# Patient Record
Sex: FEMALE | Race: OTHER | HISPANIC OR LATINO | URBAN - METROPOLITAN AREA
[De-identification: names, ages, dates, MRNs, and addresses within clinical notes are randomized per-mention and may not be internally consistent; named-entity substitution may affect disease eponyms.]

---

## 2019-09-20 ENCOUNTER — INPATIENT (INPATIENT)
Facility: HOSPITAL | Age: 59
LOS: 0 days | Discharge: HOME | DRG: 71 | End: 2019-09-21
Attending: HOSPITALIST | Admitting: HOSPITALIST
Payer: COMMERCIAL

## 2019-09-20 VITALS
HEART RATE: 66 BPM | DIASTOLIC BLOOD PRESSURE: 83 MMHG | TEMPERATURE: 98 F | WEIGHT: 220.02 LBS | OXYGEN SATURATION: 99 % | RESPIRATION RATE: 20 BRPM | HEIGHT: 65 IN | SYSTOLIC BLOOD PRESSURE: 147 MMHG

## 2019-09-20 DIAGNOSIS — R41.82 ALTERED MENTAL STATUS, UNSPECIFIED: ICD-10-CM

## 2019-09-20 DIAGNOSIS — G93.40 ENCEPHALOPATHY, UNSPECIFIED: ICD-10-CM

## 2019-09-20 LAB
ALBUMIN SERPL ELPH-MCNC: 4.5 G/DL — SIGNIFICANT CHANGE UP (ref 3.3–5.2)
ALP SERPL-CCNC: 74 U/L — SIGNIFICANT CHANGE UP (ref 40–120)
ALT FLD-CCNC: 12 U/L — SIGNIFICANT CHANGE UP
ANION GAP SERPL CALC-SCNC: 13 MMOL/L — SIGNIFICANT CHANGE UP (ref 5–17)
APPEARANCE UR: CLEAR — SIGNIFICANT CHANGE UP
APTT BLD: 35.9 SEC — SIGNIFICANT CHANGE UP (ref 27.5–36.3)
AST SERPL-CCNC: 21 U/L — SIGNIFICANT CHANGE UP
BACTERIA # UR AUTO: NEGATIVE — SIGNIFICANT CHANGE UP
BASOPHILS # BLD AUTO: 0.03 K/UL — SIGNIFICANT CHANGE UP (ref 0–0.2)
BASOPHILS NFR BLD AUTO: 0.6 % — SIGNIFICANT CHANGE UP (ref 0–2)
BILIRUB SERPL-MCNC: 0.4 MG/DL — SIGNIFICANT CHANGE UP (ref 0.4–2)
BILIRUB UR-MCNC: NEGATIVE — SIGNIFICANT CHANGE UP
BLD GP AB SCN SERPL QL: SIGNIFICANT CHANGE UP
BUN SERPL-MCNC: 17 MG/DL — SIGNIFICANT CHANGE UP (ref 8–20)
CALCIUM SERPL-MCNC: 9.7 MG/DL — SIGNIFICANT CHANGE UP (ref 8.6–10.2)
CHLORIDE SERPL-SCNC: 105 MMOL/L — SIGNIFICANT CHANGE UP (ref 98–107)
CO2 SERPL-SCNC: 25 MMOL/L — SIGNIFICANT CHANGE UP (ref 22–29)
COLOR SPEC: YELLOW — SIGNIFICANT CHANGE UP
CREAT SERPL-MCNC: 0.58 MG/DL — SIGNIFICANT CHANGE UP (ref 0.5–1.3)
DIFF PNL FLD: NEGATIVE — SIGNIFICANT CHANGE UP
EOSINOPHIL # BLD AUTO: 0.02 K/UL — SIGNIFICANT CHANGE UP (ref 0–0.5)
EOSINOPHIL NFR BLD AUTO: 0.4 % — SIGNIFICANT CHANGE UP (ref 0–6)
EPI CELLS # UR: SIGNIFICANT CHANGE UP
ETHANOL SERPL-MCNC: <10 MG/DL — SIGNIFICANT CHANGE UP
GLUCOSE SERPL-MCNC: 88 MG/DL — SIGNIFICANT CHANGE UP (ref 70–115)
GLUCOSE UR QL: NEGATIVE MG/DL — SIGNIFICANT CHANGE UP
HBA1C BLD-MCNC: 5.1 % — SIGNIFICANT CHANGE UP (ref 4–5.6)
HCT VFR BLD CALC: 39.2 % — SIGNIFICANT CHANGE UP (ref 34.5–45)
HGB BLD-MCNC: 13.1 G/DL — SIGNIFICANT CHANGE UP (ref 11.5–15.5)
IMM GRANULOCYTES NFR BLD AUTO: 0.4 % — SIGNIFICANT CHANGE UP (ref 0–1.5)
INR BLD: 1.01 RATIO — SIGNIFICANT CHANGE UP (ref 0.88–1.16)
KETONES UR-MCNC: NEGATIVE — SIGNIFICANT CHANGE UP
LEUKOCYTE ESTERASE UR-ACNC: ABNORMAL
LYMPHOCYTES # BLD AUTO: 1.41 K/UL — SIGNIFICANT CHANGE UP (ref 1–3.3)
LYMPHOCYTES # BLD AUTO: 26.5 % — SIGNIFICANT CHANGE UP (ref 13–44)
MCHC RBC-ENTMCNC: 29.3 PG — SIGNIFICANT CHANGE UP (ref 27–34)
MCHC RBC-ENTMCNC: 33.4 GM/DL — SIGNIFICANT CHANGE UP (ref 32–36)
MCV RBC AUTO: 87.7 FL — SIGNIFICANT CHANGE UP (ref 80–100)
MONOCYTES # BLD AUTO: 0.38 K/UL — SIGNIFICANT CHANGE UP (ref 0–0.9)
MONOCYTES NFR BLD AUTO: 7.1 % — SIGNIFICANT CHANGE UP (ref 2–14)
NEUTROPHILS # BLD AUTO: 3.47 K/UL — SIGNIFICANT CHANGE UP (ref 1.8–7.4)
NEUTROPHILS NFR BLD AUTO: 65 % — SIGNIFICANT CHANGE UP (ref 43–77)
NITRITE UR-MCNC: NEGATIVE — SIGNIFICANT CHANGE UP
PH UR: 6.5 — SIGNIFICANT CHANGE UP (ref 5–8)
PLATELET # BLD AUTO: 280 K/UL — SIGNIFICANT CHANGE UP (ref 150–400)
POTASSIUM SERPL-MCNC: 4.2 MMOL/L — SIGNIFICANT CHANGE UP (ref 3.5–5.3)
POTASSIUM SERPL-SCNC: 4.2 MMOL/L — SIGNIFICANT CHANGE UP (ref 3.5–5.3)
PROT SERPL-MCNC: 7.9 G/DL — SIGNIFICANT CHANGE UP (ref 6.6–8.7)
PROT UR-MCNC: NEGATIVE MG/DL — SIGNIFICANT CHANGE UP
PROTHROM AB SERPL-ACNC: 11.6 SEC — SIGNIFICANT CHANGE UP (ref 10–12.9)
RBC # BLD: 4.47 M/UL — SIGNIFICANT CHANGE UP (ref 3.8–5.2)
RBC # FLD: 12.2 % — SIGNIFICANT CHANGE UP (ref 10.3–14.5)
RBC CASTS # UR COMP ASSIST: NEGATIVE /HPF — SIGNIFICANT CHANGE UP (ref 0–4)
SODIUM SERPL-SCNC: 143 MMOL/L — SIGNIFICANT CHANGE UP (ref 135–145)
SP GR SPEC: 1.01 — SIGNIFICANT CHANGE UP (ref 1.01–1.02)
TROPONIN T SERPL-MCNC: <0.01 NG/ML — SIGNIFICANT CHANGE UP (ref 0–0.06)
UROBILINOGEN FLD QL: NEGATIVE MG/DL — SIGNIFICANT CHANGE UP
WBC # BLD: 5.33 K/UL — SIGNIFICANT CHANGE UP (ref 3.8–10.5)
WBC # FLD AUTO: 5.33 K/UL — SIGNIFICANT CHANGE UP (ref 3.8–10.5)
WBC UR QL: SIGNIFICANT CHANGE UP

## 2019-09-20 PROCEDURE — 70496 CT ANGIOGRAPHY HEAD: CPT | Mod: 26

## 2019-09-20 PROCEDURE — 95819 EEG AWAKE AND ASLEEP: CPT | Mod: 26

## 2019-09-20 PROCEDURE — 99223 1ST HOSP IP/OBS HIGH 75: CPT

## 2019-09-20 PROCEDURE — 71045 X-RAY EXAM CHEST 1 VIEW: CPT | Mod: 26

## 2019-09-20 PROCEDURE — 70498 CT ANGIOGRAPHY NECK: CPT | Mod: 26

## 2019-09-20 PROCEDURE — 70450 CT HEAD/BRAIN W/O DYE: CPT | Mod: 26,59

## 2019-09-20 PROCEDURE — 70551 MRI BRAIN STEM W/O DYE: CPT | Mod: 26

## 2019-09-20 PROCEDURE — 99285 EMERGENCY DEPT VISIT HI MDM: CPT

## 2019-09-20 RX ORDER — ATORVASTATIN CALCIUM 80 MG/1
20 TABLET, FILM COATED ORAL AT BEDTIME
Refills: 0 | Status: DISCONTINUED | OUTPATIENT
Start: 2019-09-20 | End: 2019-09-21

## 2019-09-20 RX ORDER — ASPIRIN/CALCIUM CARB/MAGNESIUM 324 MG
81 TABLET ORAL DAILY
Refills: 0 | Status: DISCONTINUED | OUTPATIENT
Start: 2019-09-20 | End: 2019-09-21

## 2019-09-20 RX ORDER — ACETAMINOPHEN 500 MG
650 TABLET ORAL ONCE
Refills: 0 | Status: COMPLETED | OUTPATIENT
Start: 2019-09-20 | End: 2019-09-20

## 2019-09-20 RX ORDER — METOCLOPRAMIDE HCL 10 MG
10 TABLET ORAL ONCE
Refills: 0 | Status: COMPLETED | OUTPATIENT
Start: 2019-09-20 | End: 2019-09-20

## 2019-09-20 RX ORDER — ACETAMINOPHEN 500 MG
650 TABLET ORAL EVERY 6 HOURS
Refills: 0 | Status: DISCONTINUED | OUTPATIENT
Start: 2019-09-20 | End: 2019-09-21

## 2019-09-20 RX ADMIN — ATORVASTATIN CALCIUM 20 MILLIGRAM(S): 80 TABLET, FILM COATED ORAL at 22:56

## 2019-09-20 RX ADMIN — Medication 81 MILLIGRAM(S): at 12:11

## 2019-09-20 RX ADMIN — Medication 650 MILLIGRAM(S): at 12:30

## 2019-09-20 RX ADMIN — Medication 650 MILLIGRAM(S): at 15:16

## 2019-09-20 RX ADMIN — Medication 10 MILLIGRAM(S): at 14:30

## 2019-09-20 NOTE — H&P ADULT - ASSESSMENT
60 yo femlae with no significant PMH admitted for altered  mental status and right sided facial numbness , seen by neurology CTA of brain neck done no pathology , elevated BP in the ED , no h/o HTN per      1- Acute encephalopahy with paresthesias on the right face   neurology consult appreciated   MR to asses for stroke   continue aspirin statin   monitor BP , treat if needed     2- Elevated bloop pressure   no h/o HTN   may have labile HTN , will cont to monitor treat if needed   BPq4h     pending neurology work up and BP home soon

## 2019-09-20 NOTE — ED ADULT TRIAGE NOTE - CHIEF COMPLAINT QUOTE
got up 4:00am shira to [employer] came back  home at 5;30-6, when she got home she asked  why did you leave me at [employer] and then asking about food that was in her car that she bought and he didn't know anyhting about

## 2019-09-20 NOTE — CONSULT NOTE ADULT - ASSESSMENT
Impression:  Encephalopathy to assess for cva.    Plan:    MRI Brain without maxime to assess for cva.  ER getting CTA head/neck.  UDS screen.  EEG to assess for seizures.  Recommend baby asa qd.  DVT prophylaxis.  Avoid sedatives.  D/w ER MD and pt.  Will follow.

## 2019-09-20 NOTE — EEG REPORT - NS EEG TEXT BOX
[facility]   COMPREHENSIVE EPILEPSY CENTER   REPORT OF ROUTINE VIDEO EEG     [facility]: [address], [place] [address], Ph#: [phone]  [facility]: [address], [place] [address], Ph#: [phone]  [facility]: [address] [place] [address]    Patient Name: KELSI LEIJA  Age and : 59y (60)  MRN #: [id number]  Location: [place] [address]  Referring Physician: Faustino Gomez    Study Date: 19    _____________________________________________________________  TECHNICAL INFORMATION    Placement and Labeling of Electrodes:  The EEG was performed utilizing 20 channels referential EEG connections (coronal over temporal over parasagittal montage) using all standard 10-20 electrode placements with EKG.  Recording was at a sampling rate of 256 samples per second per channel.  Time synchronized digital video recording was done simultaneously with EEG recording.  A low light infrared camera was used for low light recording.  Blayne and seizure detection algorithms were utilized.    _____________________________________________________________  HISTORY    Patient is a 59y old  Female who presents with a chief complaint of     PERTINENT MEDICATION:  none    _____________________________________________________________  STUDY INTERPRETATION    Findings: The background was continuous, spontaneously variable and reactive. During wakefulness, the posterior dominant rhythm consisted of symmetric, well-modulated 10-11 Hz activity, with amplitude to 30 uV, that attenuated to eye opening.  Low amplitude frontal beta was noted in wakefulness.    Background Slowing:  No generalized background slowing was present.    Focal Slowing:   None were present.    Sleep Background:  Drowsiness was characterized by fragmentation, attenuation, and slowing of the background activity.    Sleep was characterized by the presence of vertex waves, symmetric sleep spindles and K-complexes.    Other Non-Epileptiform Findings:  None were present.    Interictal Epileptiform Activity:   None were present.    Events:  Clinical events: None recorded.  Seizures: None recorded.    Activation Procedures:   Hyperventilation was not performed.    Photic stimulation was performed and did not elicit any abnormality.     Artifacts:  Intermittent myogenic and movement artifacts were noted.    ECG:  The heart rate on single channel ECG was predominantly between 60-70 BPM.    _____________________________________________________________  EEG SUMMARY/CLASSIFICATION    Normal EEG in the awake, drowsy and asleep states.    _____________________________________________________________  EEG IMPRESSION/CLINICAL CORRELATE    Normal EEG study.  No epileptiform pattern or seizure seen.    _____________________________________________________________    Gavin Kelsey MD  Attending Physician, [facility] [place]

## 2019-09-20 NOTE — ED PROVIDER NOTE - PHYSICAL EXAMINATION
Gen: Alert, NAD  Head: NC, AT, PERRL, EOMI, normal lids/conjunctiva  Neck: +supple, no tenderness/meningismus/JVD, +Trachea midline  Pulm: Bilateral BS, normal resp effort, no wheeze/stridor/retractions  CV: RRR, no M/R/G, +dist pulses  Abd: soft, NT/ND, +BS, no hepatosplenomegaly  Mskel: no edema/erythema/cyanosis  Skin: no rash  Neuro: AAOx1,  see NIHSS below

## 2019-09-20 NOTE — ED ADULT NURSE NOTE - OBJECTIVE STATEMENT
Assumed pt care at 0900.  Pt presenting with acute AMS.  LKW last night, pt a&ox1, MAEx4, no acute s/s of respiratory distress noted or reported at this time,  will continue to monitor.

## 2019-09-20 NOTE — ED PROVIDER NOTE - NS ED ROS FT

## 2019-09-20 NOTE — H&P ADULT - HISTORY OF PRESENT ILLNESS
59y Female RH Sammarinese speaking with no significant  pmh presents with ams upon awakening this AM. Patient last known well last night at 9pm.  As per ER and her  states he went to work and wife went to [employer] around 4am, but he did not see her prior to leaving house.  Spouse called at approximately 5 am and states patient was confused and was asking why he didn't pick her up from work.  Patient denies headache. denies n/v , denies blurry vision/double vision. denies numbness/tingling. denies focal weakness. denies cp/sob/palp.  No reports of fevers or similar in past.  No head injuries nor any fall.  Complains of disturbance on the right side of face lip numbness and mild headache during my evalutaion , mental status much improved now , pt does not recall what happened in am

## 2019-09-20 NOTE — ED ADULT NURSE NOTE - CHIEF COMPLAINT QUOTE
got up 4:00am [employer] to [employer] came back  home at 5;30-6, when she got home she asked  why did you leave me at [employer] and then asking about food that was in her car that she bought and he didn't know anyhting about

## 2019-09-20 NOTE — ED ADULT NURSE REASSESSMENT NOTE - NS ED NURSE REASSESS COMMENT FT1
Pt mental status has improved, a&ox2-3, aware of name, birthday, date and month, unsure of year.  Report given to BB RN ESSU, no acute s/s of respiratory distress noted or reported at this time, will continue to monitor

## 2019-09-20 NOTE — CONSULT NOTE ADULT - SUBJECTIVE AND OBJECTIVE BOX
HPI: 59yFemale RH Korean speaking with no signif pmh presents with ams upon awakening this AM. Patient last known well last night at 9pm.  As per ER and family,  states he went to work and wife went to [employer] around 4am, but he did not see her prior to leaving house.  Spouse called at approximately 5am and states patient was confused and was asking why he didn't pick her up from work.  Patient denies headache. denies n/v. denies blurry vision/double vision. denies numbness/tingling. denies focal weakness. denies cp/sob/palp.  No reports of fevers or similar in past.  No head injuries nor any fall.  Complains of disturbance L side of face.      PAST MEDICAL & SURGICAL HISTORY:  No pertinent past medical history  No significant past surgical history    MEDICATIONS  (STANDING):    MEDICATIONS  (PRN):    Allergies    No Known Allergies    Intolerances        FAMILY HISTORY:          SOCIAL HISTORY:  Denies toxic habits;     REVIEW OF SYSTEMS:    As noted in the HPI.    VITAL SIGNS:  Vital Signs Last 24 Hrs  T(C): 36.7 (20 Sep 2019 08:31), Max: 36.7 (20 Sep 2019 08:31)  T(F): 98.1 (20 Sep 2019 08:31), Max: 98.1 (20 Sep 2019 08:31)  HR: 66 (20 Sep 2019 08:31) (66 - 66)  BP: 147/83 (20 Sep 2019 08:31) (147/83 - 147/83)  BP(mean): --  RR: 20 (20 Sep 2019 08:31) (20 - 20)  SpO2: 99% (20 Sep 2019 08:31) (99% - 99%)    PHYSICAL EXAMINATION:  General: Well-developed, well nourished, in no acute distress.  Eyes: Conjunctiva and sclera clear. Fundoscopic examination was deferred.  Neck: Supple.  Cardiac: +S1 & S2; Regular.  Chest: CTA b/l.    Musculoskeletal: No tenderness on palpation of spine.  No Brudzinski/Kernig's sign.    Neurologic:  - Mental Status:  Alert, awake, oriented to self and place, but not time; Speech is fluent with intact naming, repetition, and comprehension  Cranial Nerves II-XII:    II:  Visual acuity is normal for age ; Visual fields are full to confrontation; Pupils are equal, round, and reactive to light.  III, IV, VI:  Extraocular movements are intact without nystagmus.  V:  Facial sensation dec to LT L side of face subjective.  VII:  Face is symmetric with normal eye closure and smile  VIII:  Hearing is intact and symmetric for age  IX, X:  Uvula is midline and soft palate rises symmetrically  XI:  Head turning and shoulder shrug are intact.  XII:  Tongue protrudes in the midline.  - Motor:  Strength is 5/5 throughout.  There is no pronator drift.  Normal muscle bulk and tone throughout.  - Reflexes:  2+ and symmetric throughout.  - Sensory:  Intact and symmetric to light touch, and joint-position sense.  - Coordination:  No dysmetria/dysdiadochokinesis.   - Gait: Deferred.      LABS:                          13.1   5.33  )-----------( 280      ( 20 Sep 2019 10:03 )             39.2     20 Sep 2019 10:03    143    |  105    |  17.0   ----------------------------<  88     4.2     |  25.0   |  0.58     Ca    9.7        20 Sep 2019 10:03    TPro  7.9    /  Alb  4.5    /  TBili  0.4    /  DBili  x      /  AST  21     /  ALT  12     /  AlkPhos  74     20 Sep 2019 10:03    LIVER FUNCTIONS - ( 20 Sep 2019 10:03 )  Alb: 4.5 g/dL / Pro: 7.9 g/dL / ALK PHOS: 74 U/L / ALT: 12 U/L / AST: 21 U/L / GGT: x           PT/INR - ( 20 Sep 2019 10:03 )   PT: 11.6 sec;   INR: 1.01 ratio         PTT - ( 20 Sep 2019 10:03 )  PTT:35.9 sec      RADIOLOGY & ADDITIONAL STUDIES:      CT Head No Cont (09.20.19 @ 09:14) >  : No acute intracranial hemorrhage or mass effect.

## 2019-09-20 NOTE — ED PROVIDER NOTE - OBJECTIVE STATEMENT
59yoF; with no signif pmh; now p/w ams. patient last known well last night at 9pm.   states he went to work and wife went to [employer] around 4am, but he did not see her prior to leaving house.  he called at approximately 5am and states patient was confused and was asking why he didn't pick her up from work.  patient denies headache. denies n/v. denies blurry vision/double vision. denies numbness/tingling. denies focal weakness. denies cp/sob/palp.  PMH:  denies  SOCIAL: No tobacco/illicit substance use/socialEtOH

## 2019-09-21 ENCOUNTER — TRANSCRIPTION ENCOUNTER (OUTPATIENT)
Age: 59
End: 2019-09-21

## 2019-09-21 VITALS
HEART RATE: 62 BPM | TEMPERATURE: 99 F | DIASTOLIC BLOOD PRESSURE: 53 MMHG | SYSTOLIC BLOOD PRESSURE: 87 MMHG | OXYGEN SATURATION: 96 % | RESPIRATION RATE: 18 BRPM

## 2019-09-21 DIAGNOSIS — G45.4 TRANSIENT GLOBAL AMNESIA: ICD-10-CM

## 2019-09-21 LAB
CHOLEST SERPL-MCNC: 189 MG/DL — SIGNIFICANT CHANGE UP (ref 110–199)
HDLC SERPL-MCNC: 58 MG/DL — SIGNIFICANT CHANGE UP
LIPID PNL WITH DIRECT LDL SERPL: 116 MG/DL — SIGNIFICANT CHANGE UP
TOTAL CHOLESTEROL/HDL RATIO MEASUREMENT: 3 RATIO — LOW (ref 3.3–7.1)
TRIGL SERPL-MCNC: 73 MG/DL — SIGNIFICANT CHANGE UP (ref 10–200)
TSH SERPL-MCNC: 1.97 UIU/ML — SIGNIFICANT CHANGE UP (ref 0.27–4.2)

## 2019-09-21 PROCEDURE — 86850 RBC ANTIBODY SCREEN: CPT

## 2019-09-21 PROCEDURE — 81001 URINALYSIS AUTO W/SCOPE: CPT

## 2019-09-21 PROCEDURE — 86901 BLOOD TYPING SEROLOGIC RH(D): CPT

## 2019-09-21 PROCEDURE — 86803 HEPATITIS C AB TEST: CPT

## 2019-09-21 PROCEDURE — 70496 CT ANGIOGRAPHY HEAD: CPT

## 2019-09-21 PROCEDURE — 83605 ASSAY OF LACTIC ACID: CPT

## 2019-09-21 PROCEDURE — 86900 BLOOD TYPING SEROLOGIC ABO: CPT

## 2019-09-21 PROCEDURE — 93306 TTE W/DOPPLER COMPLETE: CPT | Mod: 26

## 2019-09-21 PROCEDURE — 85730 THROMBOPLASTIN TIME PARTIAL: CPT

## 2019-09-21 PROCEDURE — 70450 CT HEAD/BRAIN W/O DYE: CPT

## 2019-09-21 PROCEDURE — T1013: CPT

## 2019-09-21 PROCEDURE — 70498 CT ANGIOGRAPHY NECK: CPT

## 2019-09-21 PROCEDURE — 85027 COMPLETE CBC AUTOMATED: CPT

## 2019-09-21 PROCEDURE — 71045 X-RAY EXAM CHEST 1 VIEW: CPT

## 2019-09-21 PROCEDURE — 36415 COLL VENOUS BLD VENIPUNCTURE: CPT

## 2019-09-21 PROCEDURE — 83036 HEMOGLOBIN GLYCOSYLATED A1C: CPT

## 2019-09-21 PROCEDURE — 82962 GLUCOSE BLOOD TEST: CPT

## 2019-09-21 PROCEDURE — 70551 MRI BRAIN STEM W/O DYE: CPT

## 2019-09-21 PROCEDURE — 80053 COMPREHEN METABOLIC PANEL: CPT

## 2019-09-21 PROCEDURE — 84484 ASSAY OF TROPONIN QUANT: CPT

## 2019-09-21 PROCEDURE — 99285 EMERGENCY DEPT VISIT HI MDM: CPT | Mod: 25

## 2019-09-21 PROCEDURE — 95819 EEG AWAKE AND ASLEEP: CPT

## 2019-09-21 PROCEDURE — 93306 TTE W/DOPPLER COMPLETE: CPT

## 2019-09-21 PROCEDURE — 85610 PROTHROMBIN TIME: CPT

## 2019-09-21 PROCEDURE — 84443 ASSAY THYROID STIM HORMONE: CPT

## 2019-09-21 PROCEDURE — 80061 LIPID PANEL: CPT

## 2019-09-21 PROCEDURE — 99239 HOSP IP/OBS DSCHRG MGMT >30: CPT

## 2019-09-21 PROCEDURE — 93005 ELECTROCARDIOGRAM TRACING: CPT

## 2019-09-21 PROCEDURE — 96374 THER/PROPH/DIAG INJ IV PUSH: CPT | Mod: XU

## 2019-09-21 PROCEDURE — 80307 DRUG TEST PRSMV CHEM ANLYZR: CPT

## 2019-09-21 RX ORDER — ENOXAPARIN SODIUM 100 MG/ML
40 INJECTION SUBCUTANEOUS DAILY
Refills: 0 | Status: DISCONTINUED | OUTPATIENT
Start: 2019-09-21 | End: 2019-09-21

## 2019-09-21 RX ORDER — INFLUENZA VIRUS VACCINE 15; 15; 15; 15 UG/.5ML; UG/.5ML; UG/.5ML; UG/.5ML
0.5 SUSPENSION INTRAMUSCULAR ONCE
Refills: 0 | Status: COMPLETED | OUTPATIENT
Start: 2019-09-21 | End: 2019-09-21

## 2019-09-21 RX ORDER — ASPIRIN/CALCIUM CARB/MAGNESIUM 324 MG
1 TABLET ORAL
Qty: 0 | Refills: 0 | DISCHARGE
Start: 2019-09-21

## 2019-09-21 RX ADMIN — Medication 81 MILLIGRAM(S): at 12:17

## 2019-09-21 RX ADMIN — ENOXAPARIN SODIUM 40 MILLIGRAM(S): 100 INJECTION SUBCUTANEOUS at 12:17

## 2019-09-21 NOTE — PROGRESS NOTE ADULT - ASSESSMENT
58 yo femlae with no significant PMH admitted for altered  mental status and right sided facial numbness , seen by neurology CTA of brain neck done no pathology , elevated BP in the ED , no h/o HTN per      1- Transient Global Amnesia   neurology consult appreciated   MRI, CT brain, EEG negative  continue aspirin, statin   f/u neuro to arrange 24hr EEG  Plan for DC home today    2- Elevated blood pressure   no h/o HTN

## 2019-09-21 NOTE — DISCHARGE NOTE PROVIDER - CARE PROVIDER_API CALL
Chacho Alejandro)  Neurology  [address] [place] [address]  Phone: [phone]  Fax: [phone]  Follow Up Time:

## 2019-09-21 NOTE — DISCHARGE NOTE PROVIDER - NSDCCPCAREPLAN_GEN_ALL_CORE_FT
PRINCIPAL DISCHARGE DIAGNOSIS  Diagnosis: Acute encephalopathy  Assessment and Plan of Treatment: PRINCIPAL DISCHARGE DIAGNOSIS  Diagnosis: Acute encephalopathy  Assessment and Plan of Treatment:       SECONDARY DISCHARGE DIAGNOSES  Diagnosis: Transient global amnesia  Assessment and Plan of Treatment: PRINCIPAL DISCHARGE DIAGNOSIS  Diagnosis: Acute encephalopathy  Assessment and Plan of Treatment: Resolved. Continue aspirin daily. F/u Primary care provider and neurology.      SECONDARY DISCHARGE DIAGNOSES  Diagnosis: Transient global amnesia  Assessment and Plan of Treatment: Follow up neurology to schedule 24hr EEG

## 2019-09-21 NOTE — PROGRESS NOTE ADULT - SUBJECTIVE AND OBJECTIVE BOX
KELSI LEIJA    [id number]    59y      Female    INTERVAL HPI/OVERNIGHT EVENTS: Pt seen and examined at bedside. No acute events overnight. Pt reports feeling better since yesterday. No recurrent confusion or facial disturbances.     REVIEW OF SYSTEMS:    CONSTITUTIONAL: No fever or fatigue  RESPIRATORY: No cough, wheezing, hemoptysis; No shortness of breath  CARDIOVASCULAR: No chest pain, palpitations  GASTROINTESTINAL: No abdominal or epigastric pain. No nausea, vomiting  NEUROLOGICAL: No headaches, loss of strength, numbness, tingling    Vital Signs Last 24 Hrs  T(C): 36.7 (21 Sep 2019 12:30), Max: 37.1 (20 Sep 2019 18:58)  T(F): 98.1 (21 Sep 2019 12:30), Max: 98.8 (20 Sep 2019 18:58)  HR: 63 (21 Sep 2019 12:30) (55 - 69)  BP: 105/62 (21 Sep 2019 12:30) (93/60 - 132/83)  BP(mean): --  RR: 17 (21 Sep 2019 12:30) (17 - 18)  SpO2: 99% (21 Sep 2019 12:30) (95% - 99%)    PHYSICAL EXAM:    GENERAL: NAD  HEENT: PERRL, +EOMI  NECK: soft, supple  CHEST/LUNG: Clear to auscultation bilaterally; No wheezing  HEART: S1S2+, Regular rate and rhythm; No murmurs, rubs, or gallops  ABDOMEN: Soft, Nontender, Nondistended; Bowel sounds present  EXTREMITIES:  2+ Peripheral Pulses, No clubbing, cyanosis, or edema  SKIN: No rashes or lesions  NEURO: AAOX3, no focal deficits, no motor or sensory loss  PSYCH: normal mood    LABS:                        13.1   5.33  )-----------( 280      ( 20 Sep 2019 10:03 )             39.2     09-20    143  |  105  |  17.0  ----------------------------<  88  4.2   |  25.0  |  0.58    Ca    9.7      20 Sep 2019 10:03    TPro  7.9  /  Alb  4.5  /  TBili  0.4  /  DBili  x   /  AST  21  /  ALT  12  /  AlkPhos  74  09-20    PT/INR - ( 20 Sep 2019 10:03 )   PT: 11.6 sec;   INR: 1.01 ratio         PTT - ( 20 Sep 2019 10:03 )  PTT:35.9 sec  Urinalysis Basic - ( 20 Sep 2019 10:41 )    Color: Yellow / Appearance: Clear / S.010 / pH: x  Gluc: x / Ketone: Negative  / Bili: Negative / Urobili: Negative mg/dL   Blood: x / Protein: Negative mg/dL / Nitrite: Negative   Leuk Esterase: Trace / RBC: Negative /HPF / WBC 3-5   Sq Epi: x / Non Sq Epi: Occasional / Bacteria: Negative    MEDICATIONS  (STANDING):  aspirin enteric coated 81 milliGRAM(s) Oral daily  atorvastatin 20 milliGRAM(s) Oral at bedtime  enoxaparin Injectable 40 milliGRAM(s) SubCutaneous daily    MEDICATIONS  (PRN):  acetaminophen   Tablet .. 650 milliGRAM(s) Oral every 6 hours PRN Moderate Pain (4 - 6)    RADIOLOGY & ADDITIONAL TESTS:  < from: MR Head No Cont (19 @ 20:42) >  IMPRESSION: No acute infarction.    < end of copied text >    < from: CT Angio Head w/ IV Cont (19 @ 12:55) >  CT angiography neck: No hemodynamically significant stenosis of the   bilateral cervical ICAs using NASCET criteria.  Patent vertebral   arteries.  No evidence of vascular dissection.    CT angiography brain: No major vessel occlusion. No evidence of aneurysm.    < end of copied text >  Normal EEG in the awake, drowsy and asleep states.

## 2019-09-21 NOTE — DISCHARGE NOTE NURSING/CASE MANAGEMENT/SOCIAL WORK - PATIENT PORTAL LINK FT
You can access the FollowMyHealth Patient Portal offered by [facility] by registering at the following website: [email or web address]. By joining [employer]’s FollowMyHealth portal, you will also be able to view your health information using other applications (apps) compatible with our system.

## 2019-09-21 NOTE — DISCHARGE NOTE PROVIDER - HOSPITAL COURSE
59y Female RH Slovenian speaking with no significant  pmh presented with ams upon awakening, pt's  noted she was confused. Patient last known well 9pm the previous night. Denied recent head injury, fall, fevers. Complained of disturbance on the right side of face lip numbness and mild headache on admission, which has now resolved. Pt does not recall events related to admission.         CT brain, CTA brain, EEG, MRI brain all negative.     < from: TTE Echo Complete w/Doppler (09.21.19 @ 09:26) >    Summary:     1. Technically good study.     2. Normal global left ventricular systolic function.     3. Left ventricular ejection fraction, by visual estimation, is 65 to     70%.     4. Mild mitral annular calcification.     5. Trace mitral valve regurgitation.     6. There is no evidence of pericardial effusion.    < end of copied text > 59y Female RH Croatian speaking with no significant  pmh presented with ams upon awakening, pt's  noted she was confused. Patient last known well 9pm the previous night. Denied recent head injury, fall, fevers. Complained of disturbance on the right side of face lip numbness and mild headache on admission, which has now resolved. Pt does not recall events related to admission.         CT brain, CTA brain, EEG, MRI brain all negative.     < from: TTE Echo Complete w/Doppler (09.21.19 @ 09:26) >    Summary:     1. Technically good study.     2. Normal global left ventricular systolic function.     3. Left ventricular ejection fraction, by visual estimation, is 65 to     70%.     4. Mild mitral annular calcification.     5. Trace mitral valve regurgitation.     6. There is no evidence of pericardial effusion.    < end of copied text >        Pt to f/u neurology to arrange 24hr EEG.

## 2019-09-21 NOTE — PROGRESS NOTE ADULT - SUBJECTIVE AND OBJECTIVE BOX
KELSI LEIJA    [id number]    59y      Female    INTERVAL HPI/OVERNIGHT EVENTS: no recurrent episodes of confusion or amnesia. no recollection of events related to admission.    Vital Signs Last 24 Hrs  T(C): 36.7 (21 Sep 2019 05:18), Max: 37.1 (20 Sep 2019 18:58)  T(F): 98.1 (21 Sep 2019 05:18), Max: 98.8 (20 Sep 2019 18:58)  HR: 55 (21 Sep 2019 05:18) (55 - 78)  BP: 99/61 (21 Sep 2019 05:18) (93/60 - 156/82)  BP(mean): --  RR: 18 (21 Sep 2019 05:18) (18 - 20)  SpO2: 98% (21 Sep 2019 05:18) (95% - 100%)    PHYSICAL EXAM:    GENERAL: no acute distress.   NECK: no meningisumus   NEURO: awake, alert and interactive. speech fluent. no dysarthria. pupils equal. EOMI. Visual fields full to movement. facial strength normal, symmetric. no drift. no finger to nose ataxia. Motor strength 5/5 diffusely.    PSYCH: mood appropriate, cooperative.     LABS:                        13.1   5.33  )-----------( 280      ( 20 Sep 2019 10:03 )             39.2     09-    143  |  105  |  17.0  ----------------------------<  88  4.2   |  25.0  |  0.58    Ca    9.7      20 Sep 2019 10:03    TPro  7.9  /  Alb  4.5  /  TBili  0.4  /  DBili  x   /  AST  21  /  ALT  12  /  AlkPhos  74  09-20  PT/INR - ( 20 Sep 2019 10:03 )   PT: 11.6 sec;   INR: 1.01 ratio    PTT - ( 20 Sep 2019 10:03 )  PTT:35.9 sec  Urinalysis Basic - ( 20 Sep 2019 10:41 )  Color: Yellow / Appearance: Clear / S.010 / pH: x  Gluc: x / Ketone: Negative  / Bili: Negative / Urobili: Negative mg/dL   Blood: x / Protein: Negative mg/dL / Nitrite: Negative   Leuk Esterase: Trace / RBC: Negative /HPF / WBC 3-5   Sq Epi: x / Non Sq Epi: Occasional / Bacteria: Negative    MEDICATIONS  (STANDING):  aspirin enteric coated 81 milliGRAM(s) Oral daily  atorvastatin 20 milliGRAM(s) Oral at bedtime  enoxaparin Injectable 40 milliGRAM(s) SubCutaneous daily    MEDICATIONS  (PRN):  acetaminophen   Tablet .. 650 milliGRAM(s) Oral every 6 hours PRN Moderate Pain (4 - 6)      RADIOLOGY & ADDITIONAL TESTS:  CT brain - neg  CTA brain/extracranial circulation - neg  EEG - neg  MRI brain - neg

## 2019-09-21 NOTE — PROGRESS NOTE ADULT - PROBLEM SELECTOR PLAN 1
would mobilize, ambulate, d/c to home today if no other medical issues  will arrange 24 hour EEG (please advise patient will contact them to arrange this after d/c)  defer further in-patient testing from neuro perspective.

## 2019-09-21 NOTE — DISCHARGE NOTE NURSING/CASE MANAGEMENT/SOCIAL WORK - NSDCPEPTSTRK_GEN_ALL_CORE
Risk factors for stroke/Stroke support groups for patients, families, and friends/Stroke warning signs and symptoms/Signs and symptoms of stroke/Prescribed medications/Call 911 for stroke/Need for follow up after discharge/Stroke education booklet

## 2019-09-22 LAB
HCV AB S/CO SERPL IA: 0.15 S/CO — SIGNIFICANT CHANGE UP (ref 0–0.99)
HCV AB SERPL-IMP: SIGNIFICANT CHANGE UP

## 2019-12-20 ENCOUNTER — EMERGENCY (EMERGENCY)
Facility: HOSPITAL | Age: 59
LOS: 1 days | Discharge: HOME | End: 2019-12-20
Payer: SELF-PAY

## 2019-12-20 ENCOUNTER — EMERGENCY (EMERGENCY)
Facility: HOSPITAL | Age: 59
LOS: 1 days | Discharge: HOME | End: 2019-12-20
Attending: EMERGENCY MEDICINE
Payer: COMMERCIAL

## 2019-12-20 VITALS
WEIGHT: 199.96 LBS | HEIGHT: 65 IN | RESPIRATION RATE: 14 BRPM | DIASTOLIC BLOOD PRESSURE: 84 MMHG | SYSTOLIC BLOOD PRESSURE: 139 MMHG | HEART RATE: 69 BPM | TEMPERATURE: 98 F | OXYGEN SATURATION: 100 %

## 2019-12-20 PROBLEM — Z78.9 OTHER SPECIFIED HEALTH STATUS: Chronic | Status: ACTIVE | Noted: 2019-09-20

## 2019-12-20 PROCEDURE — 99283 EMERGENCY DEPT VISIT LOW MDM: CPT

## 2019-12-20 PROCEDURE — T1013: CPT

## 2019-12-20 NOTE — ED STATDOCS - PATIENT PORTAL LINK FT
You can access the FollowMyHealth Patient Portal offered by [facility] by registering at the following website: [email or web address]. By joining [employer]’s FollowMyHealth portal, you will also be able to view your health information using other applications (apps) compatible with our system.

## 2019-12-20 NOTE — ED STATDOCS - NSFOLLOWUPINSTRUCTIONS_ED_ALL_ED_FT
Use the steroid cream as prescribed: three times a day as needed for skin irritation or burning.   Return immediately to the ER for re-evaluation if your symptoms recur or worsening.

## 2019-12-20 NOTE — ED STATDOCS - CLINICAL SUMMARY MEDICAL DECISION MAKING FREE TEXT BOX
Patient with skin irritation secondary to exposure to unknown chemical in workplace, will treat with topical steroid.

## 2019-12-20 NOTE — ED STATDOCS - OBJECTIVE STATEMENT
60 y/o F pt with no significant PMHx presents to the ED c/o chemical exposure onset PTA. Associated symptoms include irritation of the face. She reports that she was at work when a pipe broke and spilled an unknown chemical onto her face. She washed her face with water and then came to the ED for further evaluation. Denies fever, difficulty breathing, or ocular compromise. Non smoker. No further acute complaints at this time.   PCP: Grace  : Ashlyn

## 2020-06-30 ENCOUNTER — EMERGENCY (EMERGENCY)
Facility: HOSPITAL | Age: 60
LOS: 1 days | Discharge: HOME | End: 2020-06-30
Attending: EMERGENCY MEDICINE
Payer: COMMERCIAL

## 2020-06-30 VITALS
OXYGEN SATURATION: 99 % | WEIGHT: 199.96 LBS | DIASTOLIC BLOOD PRESSURE: 73 MMHG | HEART RATE: 65 BPM | RESPIRATION RATE: 16 BRPM | HEIGHT: 65 IN | SYSTOLIC BLOOD PRESSURE: 107 MMHG | TEMPERATURE: 99 F

## 2020-06-30 PROCEDURE — 73080 X-RAY EXAM OF ELBOW: CPT

## 2020-06-30 PROCEDURE — 73130 X-RAY EXAM OF HAND: CPT

## 2020-06-30 PROCEDURE — 73110 X-RAY EXAM OF WRIST: CPT

## 2020-06-30 PROCEDURE — 99284 EMERGENCY DEPT VISIT MOD MDM: CPT

## 2020-06-30 PROCEDURE — 73110 X-RAY EXAM OF WRIST: CPT | Mod: 26,RT

## 2020-06-30 PROCEDURE — T1013: CPT

## 2020-06-30 PROCEDURE — 73130 X-RAY EXAM OF HAND: CPT | Mod: 26,RT

## 2020-06-30 PROCEDURE — 73080 X-RAY EXAM OF ELBOW: CPT | Mod: 26,RT

## 2020-06-30 RX ORDER — ACETAMINOPHEN 500 MG
650 TABLET ORAL ONCE
Refills: 0 | Status: COMPLETED | OUTPATIENT
Start: 2020-06-30 | End: 2020-06-30

## 2020-06-30 RX ADMIN — Medication 650 MILLIGRAM(S): at 18:33

## 2020-06-30 NOTE — ED PROVIDER NOTE - CARE PLAN
Principal Discharge DX:	Wrist sprain, right, initial encounter  Secondary Diagnosis:	Elbow pain, right

## 2020-06-30 NOTE — ED PROVIDER NOTE - PATIENT PORTAL LINK FT
You can access the FollowMyHealth Patient Portal offered by [facility] by registering at the following website: [email or web address]. By joining [employer]’s FollowMyHealth portal, you will also be able to view your health information using other applications (apps) compatible with our system.

## 2020-06-30 NOTE — ED PROVIDER NOTE - CARE PROVIDER_API CALL
Imtiaz Zamudio  ORTHOPAEDIC SURGERY  [address] [place] [address]  Phone: [phone]  Fax: [phone]  Follow Up Time:

## 2020-06-30 NOTE — ED PROVIDER NOTE - PROGRESS NOTE DETAILS
advised on prelim read on xrays and that status can change. advised on pain control and fu with pcp   verbalizes understanding

## 2020-06-30 NOTE — ED PROVIDER NOTE - ATTENDING CONTRIBUTION TO CARE
I, Alber Trejo, have personally performed a face to face diagnostic evaluation on this patient. I have reviewed the ACP note and agree with the history, exam and plan of care, except as noted.    61 yo F p/w right wrist and forearm pain s/p mechanical fall few days prior. Ecchymosis over right forearm. xray negative. velcro splint placed.

## 2020-06-30 NOTE — ED PROVIDER NOTE - NSFOLLOWUPINSTRUCTIONS_ED_ALL_ED_FT
Esguince    Un esguince es un estiramiento o desgarro en neha de los tejidos resistentes similares a la fibra (ligamentos) del cuerpo. [place] es causado por michelle lesión en el área charles un mecanismo de torsión. Los síntomas incluyen dolor, hinchazón o hematomas. Descanse jenifer vargas mini los próximos días y reanude lentamente la actividad cuando se le tolere. El hielo puede ayudar con la hinchazón y el dolor.     Alterna entre Tylenol y motrin para el control del dolor     BUSCA CUIDADO MEDICAL INMEDIATO SI TIENE CUALQUIERA DE LOS SIGUIENTES SYMPTOMS: empeoramiento del dolor, incapacidad para  jenifer parte del cuerpo, entumecimiento u hormigueo.

## 2020-06-30 NOTE — ED PROVIDER NOTE - OBJECTIVE STATEMENT
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 tried catching herself reaching out with her right dominant hand and hit her elbow as well. denies hitting her head no blood thinners no loc. pt states that her boss took her to a massage therapist afterwards. no hx of broken bones, no medication taken for pain.

## 2020-06-30 NOTE — ED PROVIDER NOTE - CLINICAL SUMMARY MEDICAL DECISION MAKING FREE TEXT BOX
female mechanical fall sunday foosh right hand with right wrist and elbow pain FROM of all extremities. xray pain control. wrist splint and fu with pcp

## 2020-07-02 ENCOUNTER — EMERGENCY (EMERGENCY)
Facility: HOSPITAL | Age: 60
LOS: 1 days | Discharge: HOME | End: 2020-07-02
Attending: EMERGENCY MEDICINE
Payer: COMMERCIAL

## 2020-07-02 VITALS
DIASTOLIC BLOOD PRESSURE: 76 MMHG | SYSTOLIC BLOOD PRESSURE: 126 MMHG | HEART RATE: 67 BPM | HEIGHT: 65 IN | TEMPERATURE: 98 F | RESPIRATION RATE: 16 BRPM | OXYGEN SATURATION: 100 % | WEIGHT: 199.96 LBS

## 2020-07-02 PROCEDURE — 99283 EMERGENCY DEPT VISIT LOW MDM: CPT | Mod: 25

## 2020-07-02 PROCEDURE — 29105 APPLICATION LONG ARM SPLINT: CPT | Mod: RT

## 2020-07-02 PROCEDURE — T1013: CPT

## 2020-07-02 PROCEDURE — 73080 X-RAY EXAM OF ELBOW: CPT | Mod: 26,RT

## 2020-07-02 PROCEDURE — 73080 X-RAY EXAM OF ELBOW: CPT

## 2020-07-02 PROCEDURE — 29105 APPLICATION LONG ARM SPLINT: CPT

## 2020-07-02 NOTE — ED PROVIDER NOTE - PATIENT PORTAL LINK FT
You can access the FollowMyHealth Patient Portal offered by [facility] by registering at the following website: [email or web address]. By joining [employer]’s FollowMyHealth portal, you will also be able to view your health information using other applications (apps) compatible with our system.

## 2020-07-02 NOTE — ED PROVIDER NOTE - OBJECTIVE STATEMENT
61 yo female call back for radial head fracture of the right elbow. returning for splinting. pt still has pain to the right elbow. right hand dominant. pt denies further or new injuries.

## 2020-07-02 NOTE — ED PROVIDER NOTE - MUSCULOSKELETAL, MLM
right elbow without noted deformity or dislocation minimal flexion and extension able to supinate and pronate 2+ radial pulse sensation intact

## 2020-07-02 NOTE — ED PROVIDER NOTE - CARE PROVIDER_API CALL
Jocelin Kowalski)  Orthopedics  [address] [place] [address], [address]  [place] [address]  Phone: [phone]  Fax: [phone]  Follow Up Time:

## 2021-03-03 NOTE — PATIENT PROFILE ADULT - CHOOSE INDICATION TO IMMUNIZE (AN ORDER WILL BE GENERATED WHEN THIS NOTE IS SAVED):
Requested Prescriptions   Pending Prescriptions Disp Refills     zolpidem ER (AMBIEN CR) 12.5 MG CR tablet [Pharmacy Med Name: zolpidem ER 12.5 mg tablet,extended release,multiphase] 30 tablet 0     Sig: Take 1 tablet (12.5 mg) by mouth nightly as needed for sleep       There is no refill protocol information for this order          
Patient is not pregnant (male or female)

## 2022-05-27 NOTE — ED PROVIDER NOTE - PHYSICAL EXAMINATION
PROVIDER:[TOKEN:[[id number]],SCHEDULEDAPPT:[06/06/2022]] right hand no deformity, right wrist with with ecchymosis over the distal radius on the palmar aspect of the wrist. + ttp over medal and lateral epicondyle, able to supinate and pronate.   2+ radial pulse sensation intact. full flexion extension of wrist  5/5  strength

## 2023-08-07 NOTE — ED PROVIDER NOTE - NS_EDPROVIDERDISPOUSERTYPE_ED_A_ED
Attending Attestation (For Attendings USE Only)... Graft Donor Site Bandage (Optional-Leave Blank If You Don't Want In Note): A pressure bandage with telfa was applied to the donor site.